# Patient Record
(demographics unavailable — no encounter records)

---

## 2025-05-08 NOTE — HISTORY OF PRESENT ILLNESS
[FreeTextEntry1] : 28 y/o, M, PMHx of Charcot Zahraa tooth disease and neuropathy presents today with difficulty walking. Was well when he was born and developed symptoms of pain in the feet and difficulty walking long distances and running when he was 8 or 9. 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 However, he does not remember nerve conduction test being performed. His foot abnormalities slowly progressed. He he continues to have pain when he walks long distances but denies difficulty climbing stairs, using his arms shoulders and hands to , rising from a squat. He has poor eyesight that has to be corrected by refraction. He denies hearing problems. He denies bladder or bowel problems or problems with erection. He does have numbness in the distal feet more on the right than the left.  He has not had any spinal surgery but did have surgery on the right side of his thigh as a child to remove a hairy nevus that was thought to be a risk for premalignant lesions. He denies any other birthmarks.  And his family history his sister has a disorder that causes photosensitivity to her retina, and she has been advised to wear dark glasses. His mother has epilepsy. On his father side his uncle has a chronic cognitive disorder. His grandparents on his father side are alive and have no conditions similar to his. His father on his mother side  of lung cancer. His mother's mother passed away a few months ago and had poor vision all her life.  2025 - 29 y/o, M, PMHx of Charcot Zahraa tooth disease who presents with mild spasms of fingers of his right hand, and reports reduction of circulation in his feet especially in cold weather. Spams of right-hand fingers began two years ago that are intermittent, and does not cause any pain, burning or tingling/numbness sensation. It does not interfere with any activities that requires him to use his hands and fingers. Reports his circulation in his feet is getting worse which began more than 5 years ago and accompanied with off balance.

## 2025-05-08 NOTE — PHYSICAL EXAM
[Person] : oriented to person [Place] : oriented to place [Time] : oriented to time [Concentration Intact] : normal concentrating ability [Visual Intact] : visual attention was ~T not ~L decreased [Naming Objects] : no difficulty naming common objects [Repeating Phrases] : no difficulty repeating a phrase [Writing A Sentence] : no difficulty writing a sentence [Fluency] : fluency intact [Comprehension] : comprehension intact [Reading] : reading intact [Past History] : adequate knowledge of personal past history [Cranial Nerves Optic (II)] : visual acuity intact bilaterally,  visual fields full to confrontation, pupils equal round and reactive to light [Cranial Nerves Oculomotor (III)] : extraocular motion intact [Cranial Nerves Trigeminal (V)] : facial sensation intact symmetrically [Cranial Nerves Facial (VII)] : face symmetrical [Cranial Nerves Vestibulocochlear (VIII)] : hearing was intact bilaterally [Cranial Nerves Glossopharyngeal (IX)] : tongue and palate midline [Cranial Nerves Accessory (XI - Cranial And Spinal)] : head turning and shoulder shrug symmetric [Cranial Nerves Hypoglossal (XII)] : there was no tongue deviation with protrusion [Motor Tone] : muscle tone was normal in all four extremities [Motor Strength] : muscle strength was normal in all four extremities [No Muscle Atrophy] : normal bulk in all four extremities [1] : great toe flexion 1/5 [Sensation Tactile Decrease] : light touch was intact [Tactile Decrease Entire Leg Right] : diminished right leg [Tactile Decrease Entire Leg Left] : diminished left leg [Pain / Temp Decrease Entire Leg - Right] : diminished right leg [Pain / Temp Decrease Entire Leg - Left] : diminished left leg [Vibration Decrease Leg / Foot Toes Both Feet] : decreased at the toes of both feet  [Position Sensation Decrease Leg/Foot At Level Of Toes] : impaired at the toes in both legs [Balance] : balance was intact [2+] : Triceps left 2+ [0] : Ankle jerk right 0 [1+] : Ankle jerk left 1+ [Sclera] : the sclera and conjunctiva were normal [PERRL With Normal Accommodation] : pupils were equal in size, round, reactive to light, with normal accommodation [Extraocular Movements] : extraocular movements were intact [Outer Ear] : the ears and nose were normal in appearance [Oropharynx] : the oropharynx was normal [Neck Appearance] : the appearance of the neck was normal [Neck Cervical Mass (___cm)] : no neck mass was observed [Jugular Venous Distention Increased] : there was no jugular-venous distention [Thyroid Diffuse Enlargement] : the thyroid was not enlarged [Thyroid Nodule] : there were no palpable thyroid nodules [] : no respiratory distress [Auscultation Breath Sounds / Voice Sounds] : lungs were clear to auscultation bilaterally [Heart Rate And Rhythm] : heart rate was normal and rhythm regular [Heart Sounds] : normal S1 and S2 [Heart Sounds Gallop] : no gallops [Murmurs] : no murmurs [Heart Sounds Pericardial Friction Rub] : no pericardial rub [Full Pulse] : the pedal pulses are present [Edema] : there was no peripheral edema [Motor Strength Upper Extremities Bilaterally] : strength was normal in both upper extremities [Past-pointing] : there was no past-pointing [Tremor] : no tremor present [Dysdiadochokinesia Bilaterally] : not present [Coordination - Dysmetria Impaired Finger-to-Nose Bilateral] : not present [Coordination - Dysmetria Impaired Heel-to-Shin Bilateral] : not present [Plantar Reflex Right Only] : normal on the right [Plantar Reflex Left Only] : normal on the left [FreeTextEntry6] : bilateral pes cavus and foot drop [FreeTextEntry1] : Cold dusky red discoloration of the skin of his feet with blanching with pressure

## 2025-05-08 NOTE — DISCUSSION/SUMMARY
[FreeTextEntry1] : 1.  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  He will be moving to [facility].  I will place him in contact with the neuromuscular group in [facility] or [facility] neurology. 2.  Advised him to continue to see podiatry but to check with me should there be any need for surgery.  The next line 3.  Have encouraged an ex showed him the massage techniques to reverse the cord and dusky appearance of the feet.  Explained the lack of sympathetic reflex changes of capillary filling. Dysfunctional capillary dilatation or constriction due to lack of control of the blood flow caused by loss of axons that bring neural reflex control to this system underlies his symptoms. I shall contact him to register him with the neuromuscular center in [address] [place] [address] Department of neurology.

## 2025-05-19 NOTE — HISTORY OF PRESENT ILLNESS
[FreeTextEntry1] : Patient presents today with bilateral foot deformity. History of CMT. Patient states that symptoms were first noticed when he was a child. He has had tendon transfer surgery on the right foot as the right lower extremity has been more affected than the left. Patient has been wearing an old pair of 's and high-top, lace-up shoes. Patient reports instability on the right lower extremity at the ankle without shoe gear. Improved with shoes.  He states he is able to walk 10K steps a day. Walking more than that distance elicits pain in the medial arches, right worse than left. Patient reports numbness to the distal aspects of the feet, right greater than left. Patient follows with Neurology.

## 2025-05-19 NOTE — PHYSICAL EXAM
[2+] : left foot dorsalis pedis 2+ [Vibration Dec.] : diminished vibratory sensation at the level of the toes [Position Sense Dec.] : diminished position sense at the level of the toes [Diminished Throughout Right Foot] : diminished sensation with monofilament testing throughout right foot [Diminished Throughout Left Foot] : diminished sensation with monofilament testing throughout left foot [Ankle Swelling (On Exam)] : not present [Varicose Veins Of Lower Extremities] : not present [] : not present [FreeTextEntry3] : CFT < 3 seconds.  Temperature gradient normal. No telangiectasias. No discoloration.  [de-identified] : Bilateral cavus foot deformity, right worse than left. Left semi-flexible. Right is rigid. Extensor digitorum longus Muscle power 1/5. Plantar flexion muscle power 5/5. Bilaterally inversion muscle power 5/5, eversion 3/5 on the left, 1/5 on the right. Steppage gait, right worse than left. Mild pain on palpation at the right medial arch with tightened fascia. Hammering of digits 1 through 5 bilaterally semi-flexible. [FreeTextEntry1] : Ankle reflexes right 0, left 1. Knee reflexes right 0, left 1.

## 2025-05-19 NOTE — PROCEDURE
[FreeTextEntry1] : X-rays bilateral foot, 3 views weight-bearing, were taken to evaluate for any underlying fractures. No acute fractures, dislocations or erosions.  Increased calcaneal pitch, right greater than left. Increased positive Meary's angle on the lateral view, right greater than left. Increased metatarsal declination on the right. Severe hammering of digits present on the right with metatarsus adductus on the right. Right talus is shortened. No significant joint narrowing, however right foot limited due to position.

## 2025-05-19 NOTE — CONSULT LETTER
[Dear  ___] : Dear  [unfilled], [Courtesy Letter:] : I had the pleasure of seeing your patient, [unfilled], in my office today. [Please see my note below.] : Please see my note below. [Sincerely,] : Sincerely, [FreeTextEntry2] : Carina Stauffer MD [address] [facility], [address] [place] [address] Phone: [phone] Fax: [phone]  [FreeTextEntry3] : Marlene Alvares DPM

## 2025-05-19 NOTE — CONSULT LETTER
[Dear  ___] : Dear  [unfilled], [Courtesy Letter:] : I had the pleasure of seeing your patient, [unfilled], in my office today. [Please see my note below.] : Please see my note below. [Sincerely,] : Sincerely, [FreeTextEntry2] : Carina Stauffer MD [address] [facility], [address] [place] [address] Phone: [phone] Fax: [phone]  [FreeTextEntry3] : Marlene Alvares DPM

## 2025-05-19 NOTE — PHYSICAL EXAM
[2+] : left foot dorsalis pedis 2+ [Vibration Dec.] : diminished vibratory sensation at the level of the toes [Position Sense Dec.] : diminished position sense at the level of the toes [Diminished Throughout Right Foot] : diminished sensation with monofilament testing throughout right foot [Diminished Throughout Left Foot] : diminished sensation with monofilament testing throughout left foot [Ankle Swelling (On Exam)] : not present [Varicose Veins Of Lower Extremities] : not present [] : not present [FreeTextEntry3] : CFT < 3 seconds.  Temperature gradient normal. No telangiectasias. No discoloration.  [de-identified] : Bilateral cavus foot deformity, right worse than left. Left semi-flexible. Right is rigid. Extensor digitorum longus Muscle power 1/5. Plantar flexion muscle power 5/5. Bilaterally inversion muscle power 5/5, eversion 3/5 on the left, 1/5 on the right. Steppage gait, right worse than left. Mild pain on palpation at the right medial arch with tightened fascia. Hammering of digits 1 through 5 bilaterally semi-flexible. [FreeTextEntry1] : Ankle reflexes right 0, left 1. Knee reflexes right 0, left 1.

## 2025-05-19 NOTE — ASSESSMENT
[FreeTextEntry1] : Impression: Pain in right foot. Cavus deformity. CMT. Difficulty walking. Hammertoe.  Treatment:  Discussed treatment options with patient. Patient's current 's are old and worn out. I discussed obtaining AFO for the right foot to aid in ankle stability, however patient prefers to continue with 's and with orthopedic shoe gear/ high tops. Patient was referred to the orthotist for fabrication of a new pair of 's and for orthopedic shoes to accommodate the devices. Medial arch pain should resolve with properly supported shoe gear and orthotics. If it does not resolve, patient is to return back for reevaluation, otherwise will see patient back as needed. No surgical intervention indicated at this time unless symptoms cannot be managed with conservative therapy.

## 2025-05-19 NOTE — CONSULT LETTER
[Dear  ___] : Dear  [unfilled], [Courtesy Letter:] : I had the pleasure of seeing your patient, [unfilled], in my office today. [Please see my note below.] : Please see my note below. [Sincerely,] : Sincerely, [FreeTextEntry2] : Carina Stauffer MD [address] [facility], [address] [place] [address] Phone: [phone] Fax: [phone]  [FreeTextEntry3] : Marlene Alvares DPM

## 2025-05-19 NOTE — PHYSICAL EXAM
[2+] : left foot dorsalis pedis 2+ [Vibration Dec.] : diminished vibratory sensation at the level of the toes [Position Sense Dec.] : diminished position sense at the level of the toes [Diminished Throughout Right Foot] : diminished sensation with monofilament testing throughout right foot [Diminished Throughout Left Foot] : diminished sensation with monofilament testing throughout left foot [Ankle Swelling (On Exam)] : not present [Varicose Veins Of Lower Extremities] : not present [] : not present [FreeTextEntry3] : CFT < 3 seconds.  Temperature gradient normal. No telangiectasias. No discoloration.  [de-identified] : Bilateral cavus foot deformity, right worse than left. Left semi-flexible. Right is rigid. Extensor digitorum longus Muscle power 1/5. Plantar flexion muscle power 5/5. Bilaterally inversion muscle power 5/5, eversion 3/5 on the left, 1/5 on the right. Steppage gait, right worse than left. Mild pain on palpation at the right medial arch with tightened fascia. Hammering of digits 1 through 5 bilaterally semi-flexible. [FreeTextEntry1] : Ankle reflexes right 0, left 1. Knee reflexes right 0, left 1.